# Patient Record
Sex: FEMALE | Race: WHITE | NOT HISPANIC OR LATINO | URBAN - NONMETROPOLITAN AREA
[De-identification: names, ages, dates, MRNs, and addresses within clinical notes are randomized per-mention and may not be internally consistent; named-entity substitution may affect disease eponyms.]

---

## 2020-11-09 ENCOUNTER — TELEPHONE ENCOUNTER (OUTPATIENT)
Dept: URBAN - NONMETROPOLITAN AREA CLINIC 4 | Facility: CLINIC | Age: 45
End: 2020-11-09

## 2020-11-19 ENCOUNTER — OFFICE VISIT (OUTPATIENT)
Dept: URBAN - NONMETROPOLITAN AREA CLINIC 4 | Facility: CLINIC | Age: 45
End: 2020-11-19
Payer: MEDICARE

## 2020-11-19 ENCOUNTER — TELEPHONE ENCOUNTER (OUTPATIENT)
Dept: URBAN - METROPOLITAN AREA CLINIC 92 | Facility: CLINIC | Age: 45
End: 2020-11-19

## 2020-11-19 DIAGNOSIS — K31.84 GASTROPARESIS: ICD-10-CM

## 2020-11-19 DIAGNOSIS — K21.9 GERD (GASTROESOPHAGEAL REFLUX DISEASE): ICD-10-CM

## 2020-11-19 DIAGNOSIS — R11.2 NAUSEA AND VOMITING: ICD-10-CM

## 2020-11-19 DIAGNOSIS — R10.84 ABDOMINAL PAIN, GENERALIZED: ICD-10-CM

## 2020-11-19 PROCEDURE — G9903 PT SCRN TBCO ID AS NON USER: HCPCS | Performed by: REGISTERED NURSE

## 2020-11-19 PROCEDURE — 1036F TOBACCO NON-USER: CPT | Performed by: REGISTERED NURSE

## 2020-11-19 PROCEDURE — G8938 BMI DOC ONL FUP NT DOC: HCPCS | Performed by: REGISTERED NURSE

## 2020-11-19 PROCEDURE — G8482 FLU IMMUNIZE ORDER/ADMIN: HCPCS | Performed by: REGISTERED NURSE

## 2020-11-19 PROCEDURE — G8427 DOCREV CUR MEDS BY ELIG CLIN: HCPCS | Performed by: REGISTERED NURSE

## 2020-11-19 PROCEDURE — 99214 OFFICE O/P EST MOD 30 MIN: CPT | Performed by: REGISTERED NURSE

## 2020-11-19 RX ORDER — CLONAZEPAM 0.5 MG/1
DAILY TABLET ORAL
Qty: 0 | Refills: 0 | Status: ACTIVE | COMMUNITY

## 2020-11-19 RX ORDER — ONDANSETRON HCL 8 MG
TAKE 1 TABLET (8 MG) BY ORAL ROUTE 3 TIMES PER DAY TABLET ORAL
Qty: 0 | Refills: 0 | Status: ACTIVE | COMMUNITY

## 2020-11-19 RX ORDER — ALBUTEROL SULFATE 90 UG/1
INHALE 1 PUFF (90 MCG) BY INHALATION ROUTE EVERY 6 HOURS AS NEEDED AEROSOL, METERED RESPIRATORY (INHALATION)
Qty: 1 | Refills: 0 | Status: ACTIVE | COMMUNITY

## 2020-11-19 RX ORDER — HYDROXYZINE PAMOATE 25 MG/1
TAKE 1 CAPSULE (25 MG) BY ORAL ROUTE 3 TIMES PER DAY CAPSULE ORAL
Qty: 0 | Refills: 0 | Status: ACTIVE | COMMUNITY

## 2020-11-19 RX ORDER — LEVOTHYROXINE SODIUM 0.17 MG/1
TAKE 1 TABLET (175 MCG) BY ORAL ROUTE ONCE DAILY TABLET ORAL 1
Qty: 0 | Refills: 0 | Status: ACTIVE | COMMUNITY

## 2020-11-19 RX ORDER — INSULIN DETEMIR 100 [IU]/ML
INJECT BY SUBCUTANEOUS ROUTE PER PRESCRIBER'S INSTRUCTIONS. INSULIN DOSING REQUIRES INDIVIDUALIZATION INJECTION, SOLUTION SUBCUTANEOUS
Qty: 1 | Refills: 0 | Status: ACTIVE | COMMUNITY

## 2020-11-19 RX ORDER — PANTOPRAZOLE SODIUM 40 MG
TAKE 1 TABLET (40 MG) BY ORAL ROUTE 2 TIMES PER DAY TABLET, DELAYED RELEASE (ENTERIC COATED) ORAL 2
Qty: 0 | Refills: 0 | Status: ACTIVE | COMMUNITY

## 2020-11-19 RX ORDER — ASPIRIN 81 MG/1
TAKE 1 TABLET (81 MG) BY ORAL ROUTE ONCE DAILY TABLET, COATED ORAL 1
Qty: 0 | Refills: 0 | Status: ACTIVE | COMMUNITY

## 2020-11-19 RX ORDER — INSULIN ASPART 100 [IU]/ML
INJECT BY SUBCUTANEOUS ROUTE PER PRESCRIBER'S INSTRUCTIONS. INSULIN DOSING REQUIRES INDIVIDUALIZATION INJECTION, SOLUTION INTRAVENOUS; SUBCUTANEOUS
Qty: 1 | Refills: 0 | Status: ACTIVE | COMMUNITY

## 2020-11-19 RX ORDER — METOCLOPRAMIDE HYDROCHLORIDE 10 MG/1
TAKE 1 TABLET (10 MG) BY ORAL ROUTE 4 TIMES PER DAY 30 MINUTES BEFORE MEALS AND AT BEDTIME TABLET ORAL
Qty: 0 | Refills: 0 | Status: ACTIVE | COMMUNITY

## 2020-11-19 RX ORDER — ARIPIPRAZOLE LAUROXIL 662 MG/2.4ML
INJECT 2.4 MILLILITERS (662 MG) BY INTRAMUSCULAR ROUTE ONCE A MONTH INJECTION, SUSPENSION, EXTENDED RELEASE INTRAMUSCULAR
Qty: 0 | Refills: 0 | Status: ACTIVE | COMMUNITY

## 2020-11-19 RX ORDER — MIRTAZAPINE 30 MG/1
TAKE 1 TABLET (30 MG) BY ORAL ROUTE ONCE DAILY BEFORE BEDTIME TABLET, FILM COATED ORAL 1
Qty: 0 | Refills: 0 | Status: ACTIVE | COMMUNITY

## 2020-11-19 NOTE — PHYSICAL EXAM CONSTITUTIONAL:
obese, well developed, well nourished , in no acute distress , ambulating without difficulty , normal communication ability , ill-appearing

## 2020-11-19 NOTE — PHYSICAL EXAM GASTROINTESTINAL
Abdomen , soft, diffuse TTP, nondistended , multiple well-healed scars, no guarding or rigidity , no masses palpable , normal bowel sounds , Liver and Spleen , no hepatomegaly present , no hepatosplenomegaly , liver nontender , spleen not palpable

## 2020-11-19 NOTE — HPI-TODAY'S VISIT:
The patient is a 43 year old /White female with PMH of GERD, gastroparesis s/p gastric pacemaker implant in 2016, s/p pyloraplasty in 2018 w/ pacemaker removal and J-tube placement on 2/26/19, s/p CCY in 10/2014, anxiety, depression, DM, who presents on self referral, for a gastroenterology evaluation for above issues. She had J-tube placed on 2/26/19 by Dr. Underwood in [place], [place]. She recently moved from [place] to [place] on 2/28/19. She reports pain around the J-tube insertion site. She was having some drainage around incision and went to [facility] last Monday, but was told it was not infected. Denies any further drainage or bleeding. No increased abd pain or N/V with TF. Currently doing gravity TF at home. She is currently taking Reglan 10 mg QID, Zofran 8 mg TID, Phernergan 25 mg BID, Protonix 40 mg BID. She has had several EGD's w/ botox inj in the past. Has been on an Abx for UTI, which is making her nauseated. Has not found a PCP in [place] yet. Denies any other complaints.   4/24/19 j tube replaced better requesting zyprexa courtesy aware of ep s/e reglan  6/19/2019 Patient continues to complain of pain around the J-tube site and leakage.  She has done well with regards to her gastroparesis however since the J-tube is been placed in [place].He has not been evaluated for problems.  9/10/2019 Pt with peg tube that had fallen out. Pt had been seen in wound clinic.  Pt reports that she has had toruble with the J tube. Now with leakage for the tract.   This is a follow-up appointment for this patient, a 44 year old /White female, after a previous visit on 03/11/2019, for an evaluation for gastroparesis. The patient states that they are doing well with no current complaints..   11-6-19 Pt returns for f/u visit. Pt had J tube with Dr Shearer. Hx of pyloroplasty in past. Pt repors hat she is intereseted in [facility] pt depressed has dm neuropathy pt with wound vac for fistula after longstanding J-tube with fistulous tract. Pt is tearful. She has DM stable   2-4-20 Pt returns for f/u visit. Pt reports that she has been doing much better since last visit. had J tube removed complicated by fistula s/p repairment.  Pt now with repair. Pt has decreased opiates and now feeling better. O/w has been in usual state of health.  3/2/20: Caridad returns for follow up. GES on 2/24/20 c/w gastric atony. She has been doing fairly well. Eats pureed diet. Stopped smoking MJ about 6 months ago and has had less episodes of N/V, abd pain. She was diagnosed with thyroid ca in Oct 2019 and has gained approx 50 lbs. Her A1c has jumped up to >10%. She has an appt with Dr. Samson today to discuss bariatric surgery. She never received a call from [place] to set up appt with Dr. Correa.  4-25-20 wife diagnosed with uterine cancer. Pt report that she saw Jessica Alicea and wanted to check on gastric atony.  Pt reports that she has an appt with the bariatric surgeon.  Pt will f/u 6 months  11/19/20: Patient presents for urgent visit. Wife was diagnosed with stage 4 uterine cancer and hospice was called to the home last week. States she has been very stressed and anxious, which has caused GP flare. Vomiting 5-7 x daily. Not able to keep liquids or medications including anti-emetics down. Denies hematemesis. She was seen in ED on 11/17 and found with ketones in urine and prescribed Abx and Zofran. CBC and CMP relatively unremarkable. , Hgb 10.5. Of note, pt never received a call from [place] to schedule appt with Dr. Correa.

## 2020-12-31 ENCOUNTER — OFFICE VISIT (OUTPATIENT)
Dept: URBAN - NONMETROPOLITAN AREA CLINIC 4 | Facility: CLINIC | Age: 45
End: 2020-12-31

## 2021-02-02 ENCOUNTER — OFFICE VISIT (OUTPATIENT)
Dept: URBAN - NONMETROPOLITAN AREA CLINIC 4 | Facility: CLINIC | Age: 46
End: 2021-02-02

## 2021-02-04 ENCOUNTER — OFFICE VISIT (OUTPATIENT)
Dept: URBAN - NONMETROPOLITAN AREA CLINIC 4 | Facility: CLINIC | Age: 46
End: 2021-02-04

## 2022-01-27 ENCOUNTER — TELEPHONE ENCOUNTER (OUTPATIENT)
Dept: URBAN - METROPOLITAN AREA CLINIC 54 | Facility: CLINIC | Age: 47
End: 2022-01-27

## 2022-01-27 RX ORDER — ALBUTEROL SULFATE 90 UG/1
INHALE 1 PUFF (90 MCG) BY INHALATION ROUTE EVERY 6 HOURS AS NEEDED AEROSOL, METERED RESPIRATORY (INHALATION)
Qty: 1 | Refills: 0 | Status: ACTIVE | COMMUNITY

## 2022-01-27 RX ORDER — PANTOPRAZOLE SODIUM 40 MG
TAKE 1 TABLET (40 MG) BY ORAL ROUTE 2 TIMES PER DAY TABLET, DELAYED RELEASE (ENTERIC COATED) ORAL 2
Qty: 0 | Refills: 0 | Status: ACTIVE | COMMUNITY

## 2022-01-27 RX ORDER — CLONAZEPAM 0.5 MG/1
DAILY TABLET ORAL
Qty: 0 | Refills: 0 | Status: ACTIVE | COMMUNITY

## 2022-01-27 RX ORDER — INSULIN DETEMIR 100 [IU]/ML
INJECT BY SUBCUTANEOUS ROUTE PER PRESCRIBER'S INSTRUCTIONS. INSULIN DOSING REQUIRES INDIVIDUALIZATION INJECTION, SOLUTION SUBCUTANEOUS
Qty: 1 | Refills: 0 | Status: ACTIVE | COMMUNITY

## 2022-01-27 RX ORDER — METOCLOPRAMIDE HYDROCHLORIDE 5 MG/5ML
10ML SOLUTION ORAL QID
Qty: 1200 ML | Refills: 3 | OUTPATIENT
Start: 2022-01-27

## 2022-01-27 RX ORDER — METOCLOPRAMIDE HYDROCHLORIDE 10 MG/1
TAKE 1 TABLET (10 MG) BY ORAL ROUTE 4 TIMES PER DAY 30 MINUTES BEFORE MEALS AND AT BEDTIME TABLET ORAL
Qty: 0 | Refills: 0 | Status: ACTIVE | COMMUNITY

## 2022-01-27 RX ORDER — ONDANSETRON HCL 8 MG
TAKE 1 TABLET (8 MG) BY ORAL ROUTE 3 TIMES PER DAY TABLET ORAL
Qty: 0 | Refills: 0 | Status: ACTIVE | COMMUNITY

## 2022-01-27 RX ORDER — MIRTAZAPINE 30 MG/1
TAKE 1 TABLET (30 MG) BY ORAL ROUTE ONCE DAILY BEFORE BEDTIME TABLET, FILM COATED ORAL 1
Qty: 0 | Refills: 0 | Status: ACTIVE | COMMUNITY

## 2022-01-27 RX ORDER — HYDROXYZINE PAMOATE 25 MG/1
TAKE 1 CAPSULE (25 MG) BY ORAL ROUTE 3 TIMES PER DAY CAPSULE ORAL
Qty: 0 | Refills: 0 | Status: ACTIVE | COMMUNITY

## 2022-01-27 RX ORDER — ASPIRIN 81 MG/1
TAKE 1 TABLET (81 MG) BY ORAL ROUTE ONCE DAILY TABLET, COATED ORAL 1
Qty: 0 | Refills: 0 | Status: ACTIVE | COMMUNITY

## 2022-01-27 RX ORDER — LEVOTHYROXINE SODIUM 0.17 MG/1
TAKE 1 TABLET (175 MCG) BY ORAL ROUTE ONCE DAILY TABLET ORAL 1
Qty: 0 | Refills: 0 | Status: ACTIVE | COMMUNITY

## 2022-01-27 RX ORDER — ARIPIPRAZOLE LAUROXIL 662 MG/2.4ML
INJECT 2.4 MILLILITERS (662 MG) BY INTRAMUSCULAR ROUTE ONCE A MONTH INJECTION, SUSPENSION, EXTENDED RELEASE INTRAMUSCULAR
Qty: 0 | Refills: 0 | Status: ACTIVE | COMMUNITY

## 2022-01-27 RX ORDER — INSULIN ASPART 100 [IU]/ML
INJECT BY SUBCUTANEOUS ROUTE PER PRESCRIBER'S INSTRUCTIONS. INSULIN DOSING REQUIRES INDIVIDUALIZATION INJECTION, SOLUTION INTRAVENOUS; SUBCUTANEOUS
Qty: 1 | Refills: 0 | Status: ACTIVE | COMMUNITY

## 2022-03-21 ENCOUNTER — OFFICE VISIT (OUTPATIENT)
Dept: URBAN - METROPOLITAN AREA CLINIC 54 | Facility: CLINIC | Age: 47
End: 2022-03-21

## 2022-03-21 RX ORDER — ARIPIPRAZOLE LAUROXIL 662 MG/2.4ML
INJECT 2.4 MILLILITERS (662 MG) BY INTRAMUSCULAR ROUTE ONCE A MONTH INJECTION, SUSPENSION, EXTENDED RELEASE INTRAMUSCULAR
Qty: 0 | Refills: 0 | Status: ACTIVE | COMMUNITY

## 2022-03-21 RX ORDER — ASPIRIN 81 MG/1
TAKE 1 TABLET (81 MG) BY ORAL ROUTE ONCE DAILY TABLET, COATED ORAL 1
Qty: 0 | Refills: 0 | Status: ACTIVE | COMMUNITY

## 2022-03-21 RX ORDER — INSULIN ASPART 100 [IU]/ML
INJECT BY SUBCUTANEOUS ROUTE PER PRESCRIBER'S INSTRUCTIONS. INSULIN DOSING REQUIRES INDIVIDUALIZATION INJECTION, SOLUTION INTRAVENOUS; SUBCUTANEOUS
Qty: 1 | Refills: 0 | Status: ACTIVE | COMMUNITY

## 2022-03-21 RX ORDER — METOCLOPRAMIDE HYDROCHLORIDE 10 MG/1
TAKE 1 TABLET (10 MG) BY ORAL ROUTE 4 TIMES PER DAY 30 MINUTES BEFORE MEALS AND AT BEDTIME TABLET ORAL
Qty: 0 | Refills: 0 | Status: ACTIVE | COMMUNITY

## 2022-03-21 RX ORDER — CLONAZEPAM 0.5 MG/1
DAILY TABLET ORAL
Qty: 0 | Refills: 0 | Status: ACTIVE | COMMUNITY

## 2022-03-21 RX ORDER — HYDROXYZINE PAMOATE 25 MG/1
TAKE 1 CAPSULE (25 MG) BY ORAL ROUTE 3 TIMES PER DAY CAPSULE ORAL
Qty: 0 | Refills: 0 | Status: ACTIVE | COMMUNITY

## 2022-03-21 RX ORDER — METOCLOPRAMIDE HYDROCHLORIDE 5 MG/5ML
10ML SOLUTION ORAL QID
Qty: 1200 ML | Refills: 3 | Status: ACTIVE | COMMUNITY
Start: 2022-01-27

## 2022-03-21 RX ORDER — PANTOPRAZOLE SODIUM 40 MG
TAKE 1 TABLET (40 MG) BY ORAL ROUTE 2 TIMES PER DAY TABLET, DELAYED RELEASE (ENTERIC COATED) ORAL 2
Qty: 0 | Refills: 0 | Status: ACTIVE | COMMUNITY

## 2022-03-21 RX ORDER — INSULIN DETEMIR 100 [IU]/ML
AS DIRECTED INJECTION, SOLUTION SUBCUTANEOUS
Refills: 0 | Status: ACTIVE | COMMUNITY

## 2022-03-21 RX ORDER — ALBUTEROL SULFATE 90 UG/1
INHALE 1 PUFF (90 MCG) BY INHALATION ROUTE EVERY 6 HOURS AS NEEDED AEROSOL, METERED RESPIRATORY (INHALATION)
Qty: 1 | Refills: 0 | Status: ACTIVE | COMMUNITY

## 2022-03-21 RX ORDER — MIRTAZAPINE 30 MG/1
TAKE 1 TABLET (30 MG) BY ORAL ROUTE ONCE DAILY BEFORE BEDTIME TABLET, FILM COATED ORAL 1
Qty: 0 | Refills: 0 | Status: ACTIVE | COMMUNITY

## 2022-03-21 RX ORDER — ONDANSETRON HCL 8 MG
TAKE 1 TABLET (8 MG) BY ORAL ROUTE 3 TIMES PER DAY TABLET ORAL
Qty: 0 | Refills: 0 | Status: ACTIVE | COMMUNITY

## 2022-03-21 RX ORDER — ROSUVASTATIN CALCIUM 40 MG/1
1 TABLET TABLET, FILM COATED ORAL ONCE A DAY
Status: ACTIVE | COMMUNITY

## 2022-03-21 RX ORDER — LEVOTHYROXINE SODIUM 0.17 MG/1
TAKE 1 TABLET (175 MCG) BY ORAL ROUTE ONCE DAILY TABLET ORAL 1
Qty: 0 | Refills: 0 | Status: ACTIVE | COMMUNITY

## 2022-04-20 ENCOUNTER — OFFICE VISIT (OUTPATIENT)
Dept: URBAN - NONMETROPOLITAN AREA CLINIC 4 | Facility: CLINIC | Age: 47
End: 2022-04-20

## 2022-05-23 ENCOUNTER — WEB ENCOUNTER (OUTPATIENT)
Dept: URBAN - NONMETROPOLITAN AREA CLINIC 4 | Facility: CLINIC | Age: 47
End: 2022-05-23

## 2022-05-23 ENCOUNTER — LAB OUTSIDE AN ENCOUNTER (OUTPATIENT)
Dept: URBAN - NONMETROPOLITAN AREA CLINIC 4 | Facility: CLINIC | Age: 47
End: 2022-05-23

## 2022-05-23 ENCOUNTER — CLAIMS CREATED FROM THE CLAIM WINDOW (OUTPATIENT)
Dept: URBAN - NONMETROPOLITAN AREA CLINIC 4 | Facility: CLINIC | Age: 47
End: 2022-05-23
Payer: MEDICARE

## 2022-05-23 VITALS
DIASTOLIC BLOOD PRESSURE: 77 MMHG | HEIGHT: 69 IN | SYSTOLIC BLOOD PRESSURE: 114 MMHG | WEIGHT: 274.8 LBS | BODY MASS INDEX: 40.7 KG/M2 | TEMPERATURE: 97.9 F | HEART RATE: 84 BPM

## 2022-05-23 DIAGNOSIS — K31.84 GASTROPARESIS: ICD-10-CM

## 2022-05-23 DIAGNOSIS — K46.9 HERNIA: ICD-10-CM

## 2022-05-23 DIAGNOSIS — R10.84 ABDOMINAL CRAMPING, GENERALIZED: ICD-10-CM

## 2022-05-23 DIAGNOSIS — R10.9 ABDOMINAL PAIN: ICD-10-CM

## 2022-05-23 DIAGNOSIS — Z12.11 SCREENING FOR COLON CANCER: ICD-10-CM

## 2022-05-23 DIAGNOSIS — K21.9 GERD (GASTROESOPHAGEAL REFLUX DISEASE): ICD-10-CM

## 2022-05-23 PROCEDURE — 99214 OFFICE O/P EST MOD 30 MIN: CPT | Performed by: REGISTERED NURSE

## 2022-05-23 RX ORDER — LEVOTHYROXINE SODIUM 0.17 MG/1
TAKE 1 TABLET (175 MCG) BY ORAL ROUTE ONCE DAILY TABLET ORAL 1
Qty: 0 | Refills: 0 | Status: DISCONTINUED | COMMUNITY

## 2022-05-23 RX ORDER — CLONAZEPAM 0.5 MG/1
DAILY TABLET ORAL
Qty: 0 | Refills: 0 | Status: DISCONTINUED | COMMUNITY

## 2022-05-23 RX ORDER — METOCLOPRAMIDE HYDROCHLORIDE 10 MG/1
TAKE 1 TABLET (10 MG) BY ORAL ROUTE 4 TIMES PER DAY 30 MINUTES BEFORE MEALS AND AT BEDTIME TABLET ORAL
Qty: 0 | Refills: 0 | Status: ACTIVE | COMMUNITY

## 2022-05-23 RX ORDER — POLYETHYLENE GLYCOL 3350, SODIUM SULFATE, SODIUM CHLORIDE, POTASSIUM CHLORIDE, ASCORBIC ACID, SODIUM ASCORBATE 140-9-5.2G
AS DIRECTED KIT ORAL
Qty: 1 BOTTLE | Refills: 0 | OUTPATIENT
Start: 2022-05-23 | End: 2022-05-24

## 2022-05-23 RX ORDER — METOCLOPRAMIDE HYDROCHLORIDE 5 MG/5ML
10ML SOLUTION ORAL QID
Qty: 1200 ML | Refills: 3 | Status: DISCONTINUED | COMMUNITY
Start: 2022-01-27

## 2022-05-23 RX ORDER — HYDROXYZINE PAMOATE 25 MG/1
TAKE 1 CAPSULE (25 MG) BY ORAL ROUTE 3 TIMES PER DAY CAPSULE ORAL
Qty: 0 | Refills: 0 | Status: DISCONTINUED | COMMUNITY

## 2022-05-23 RX ORDER — PANTOPRAZOLE SODIUM 40 MG
TAKE 1 TABLET (40 MG) BY ORAL ROUTE 2 TIMES PER DAY TABLET, DELAYED RELEASE (ENTERIC COATED) ORAL 2
Qty: 0 | Refills: 0 | Status: ACTIVE | COMMUNITY

## 2022-05-23 RX ORDER — ROSUVASTATIN CALCIUM 40 MG/1
1 TABLET TABLET, FILM COATED ORAL ONCE A DAY
Status: ACTIVE | COMMUNITY

## 2022-05-23 RX ORDER — INSULIN ASPART 100 [IU]/ML
INJECT BY SUBCUTANEOUS ROUTE PER PRESCRIBER'S INSTRUCTIONS. INSULIN DOSING REQUIRES INDIVIDUALIZATION INJECTION, SOLUTION INTRAVENOUS; SUBCUTANEOUS
Qty: 1 | Refills: 0 | Status: DISCONTINUED | COMMUNITY

## 2022-05-23 RX ORDER — MIRTAZAPINE 30 MG/1
TAKE 1 TABLET (30 MG) BY ORAL ROUTE ONCE DAILY BEFORE BEDTIME TABLET, FILM COATED ORAL 1
Qty: 0 | Refills: 0 | Status: DISCONTINUED | COMMUNITY

## 2022-05-23 RX ORDER — INSULIN DETEMIR 100 [IU]/ML
AS DIRECTED INJECTION, SOLUTION SUBCUTANEOUS
Refills: 0 | Status: DISCONTINUED | COMMUNITY

## 2022-05-23 RX ORDER — ALBUTEROL SULFATE 90 UG/1
INHALE 1 PUFF (90 MCG) BY INHALATION ROUTE EVERY 6 HOURS AS NEEDED AEROSOL, METERED RESPIRATORY (INHALATION)
Qty: 1 | Refills: 0 | Status: DISCONTINUED | COMMUNITY

## 2022-05-23 RX ORDER — ARIPIPRAZOLE LAUROXIL 662 MG/2.4ML
INJECT 2.4 MILLILITERS (662 MG) BY INTRAMUSCULAR ROUTE ONCE A MONTH INJECTION, SUSPENSION, EXTENDED RELEASE INTRAMUSCULAR
Qty: 0 | Refills: 0 | Status: DISCONTINUED | COMMUNITY

## 2022-05-23 RX ORDER — ASPIRIN 81 MG/1
TAKE 1 TABLET (81 MG) BY ORAL ROUTE ONCE DAILY TABLET, COATED ORAL 1
Qty: 0 | Refills: 0 | Status: DISCONTINUED | COMMUNITY

## 2022-05-23 RX ORDER — ONDANSETRON HCL 8 MG
TAKE 1 TABLET (8 MG) BY ORAL ROUTE 3 TIMES PER DAY TABLET ORAL
Qty: 0 | Refills: 0 | Status: ACTIVE | COMMUNITY

## 2022-05-23 RX ORDER — EMPAGLIFLOZIN 10 MG/1
1 TABLET TABLET, FILM COATED ORAL ONCE A DAY
Status: ACTIVE | COMMUNITY

## 2022-05-23 NOTE — HPI-TODAY'S VISIT:
The patient is a 43 year old /White female with PMH of GERD, gastroparesis s/p gastric pacemaker implant in 2016, s/p pyloraplasty in 2018 w/ pacemaker removal and J-tube placement on 2/26/19, s/p CCY in 10/2014, anxiety, depression, DM, who presents on self referral, for a gastroenterology evaluation for above issues. She had J-tube placed on 2/26/19 by Dr. Underwood in [place], [place]. She recently moved from [place] to [place] on 2/28/19. She reports pain around the J-tube insertion site. She was having some drainage around incision and went to [facility] last Monday, but was told it was not infected. Denies any further drainage or bleeding. No increased abd pain or N/V with TF. Currently doing gravity TF at home. She is currently taking Reglan 10 mg QID, Zofran 8 mg TID, Phernergan 25 mg BID, Protonix 40 mg BID. She has had several EGD's w/ botox inj in the past. Has been on an Abx for UTI, which is making her nauseated. Has not found a PCP in [place] yet. Denies any other complaints.   4/24/19 j tube replaced better requesting zyprexa courtesy aware of ep s/e reglan  6/19/2019 Patient continues to complain of pain around the J-tube site and leakage.  She has done well with regards to her gastroparesis however since the J-tube is been placed in [place].He has not been evaluated for problems.  9/10/2019 Pt with peg tube that had fallen out. Pt had been seen in wound clinic.  Pt reports that she has had toruble with the J tube. Now with leakage for the tract.   This is a follow-up appointment for this patient, a 44 year old /White female, after a previous visit on 03/11/2019, for an evaluation for gastroparesis. The patient states that they are doing well with no current complaints..   11-6-19 Pt returns for f/u visit. Pt had J tube with Dr Shearer. Hx of pyloroplasty in past. Pt repors hat she is intereseted in [facility] pt depressed has dm neuropathy pt with wound vac for fistula after longstanding J-tube with fistulous tract. Pt is tearful. She has DM stable   2-4-20 Pt returns for f/u visit. Pt reports that she has been doing much better since last visit. had J tube removed complicated by fistula s/p repairment.  Pt now with repair. Pt has decreased opiates and now feeling better. O/w has been in usual state of health.  3/2/20: Caridad returns for follow up. GES on 2/24/20 c/w gastric atony. She has been doing fairly well. Eats pureed diet. Stopped smoking MJ about 6 months ago and has had less episodes of N/V, abd pain. She was diagnosed with thyroid ca in Oct 2019 and has gained approx 50 lbs. Her A1c has jumped up to >10%. She has an appt with Dr. Samson today to discuss bariatric surgery. She never received a call from [place] to set up appt with Dr. Correa.  4-25-20 wife diagnosed with uterine cancer. Pt report that she saw Jessica Alicea and wanted to check on gastric atony.  Pt reports that she has an appt with the bariatric surgeon.  Pt will f/u 6 months  11/19/20: Patient presents for urgent visit. Wife was diagnosed with stage 4 uterine cancer and hospice was called to the home last week. States she has been very stressed and anxious, which has caused GP flare. Vomiting 5-7 x daily. Not able to keep liquids or medications including anti-emetics down. Denies hematemesis. She was seen in ED on 11/17 and found with ketones in urine and prescribed Abx and Zofran. CBC and CMP relatively unremarkable. , Hgb 10.5. Of note, pt never received a call from [place] to schedule appt with Dr. Correa.  5/23/22: Pt RTC for f/u. Needs surgery on hernias, but was told she needs to lose weight. Has lost 15 lbs over past few months. Doing well from GP standpoint. She remains on Reglan QID. Takes Zofran PRN. Spoke to Dr. Correa over phone last year, but no further intervention recommended. She has never had a screening colonoscopy.

## 2022-06-28 ENCOUNTER — OFFICE VISIT (OUTPATIENT)
Dept: URBAN - METROPOLITAN AREA SURGERY CENTER 14 | Facility: SURGERY CENTER | Age: 47
End: 2022-06-28

## 2022-06-28 RX ORDER — ROSUVASTATIN CALCIUM 40 MG/1
1 TABLET TABLET, FILM COATED ORAL ONCE A DAY
Status: ACTIVE | COMMUNITY

## 2022-06-28 RX ORDER — PANTOPRAZOLE SODIUM 40 MG
TAKE 1 TABLET (40 MG) BY ORAL ROUTE 2 TIMES PER DAY TABLET, DELAYED RELEASE (ENTERIC COATED) ORAL 2
Qty: 0 | Refills: 0 | Status: ACTIVE | COMMUNITY

## 2022-06-28 RX ORDER — EMPAGLIFLOZIN 10 MG/1
1 TABLET TABLET, FILM COATED ORAL ONCE A DAY
Status: ACTIVE | COMMUNITY

## 2022-06-28 RX ORDER — ONDANSETRON HCL 8 MG
TAKE 1 TABLET (8 MG) BY ORAL ROUTE 3 TIMES PER DAY TABLET ORAL
Qty: 0 | Refills: 0 | Status: ACTIVE | COMMUNITY

## 2022-06-28 RX ORDER — METOCLOPRAMIDE HYDROCHLORIDE 10 MG/1
TAKE 1 TABLET (10 MG) BY ORAL ROUTE 4 TIMES PER DAY 30 MINUTES BEFORE MEALS AND AT BEDTIME TABLET ORAL
Qty: 0 | Refills: 0 | Status: ACTIVE | COMMUNITY

## 2022-07-11 ENCOUNTER — ERX REFILL RESPONSE (OUTPATIENT)
Dept: URBAN - METROPOLITAN AREA CLINIC 54 | Facility: CLINIC | Age: 47
End: 2022-07-11

## 2022-07-11 RX ORDER — METOCLOPRAMIDE HYDROCHLORIDE 10 MG/1
TAKE 1 TABLET (10 MG) BY ORAL ROUTE 4 TIMES PER DAY 30 MINUTES BEFORE MEALS AND AT BEDTIME TABLET ORAL
Qty: 0 | Refills: 0 | OUTPATIENT

## 2022-07-11 RX ORDER — METOCLOPRAMIDE 5 MG/5ML
TAKE 10ML BY MOUTH 4 TIMES DAILY SOLUTION ORAL
Qty: 1200 MILLILITER | Refills: 3 | OUTPATIENT

## 2022-07-12 ENCOUNTER — OFFICE VISIT (OUTPATIENT)
Dept: URBAN - METROPOLITAN AREA CLINIC 54 | Facility: CLINIC | Age: 47
End: 2022-07-12

## 2022-07-12 RX ORDER — ROSUVASTATIN CALCIUM 40 MG/1
1 TABLET TABLET, FILM COATED ORAL ONCE A DAY
COMMUNITY

## 2022-07-12 RX ORDER — METOCLOPRAMIDE HYDROCHLORIDE 10 MG/1
TAKE 1 TABLET (10 MG) BY ORAL ROUTE 4 TIMES PER DAY 30 MINUTES BEFORE MEALS AND AT BEDTIME TABLET ORAL
Qty: 0 | Refills: 0 | COMMUNITY

## 2022-07-12 RX ORDER — EMPAGLIFLOZIN 10 MG/1
1 TABLET TABLET, FILM COATED ORAL ONCE A DAY
COMMUNITY

## 2022-07-12 RX ORDER — ONDANSETRON HCL 8 MG
TAKE 1 TABLET (8 MG) BY ORAL ROUTE 3 TIMES PER DAY TABLET ORAL
Qty: 0 | Refills: 0 | COMMUNITY

## 2022-07-12 RX ORDER — PANTOPRAZOLE SODIUM 40 MG
TAKE 1 TABLET (40 MG) BY ORAL ROUTE 2 TIMES PER DAY TABLET, DELAYED RELEASE (ENTERIC COATED) ORAL 2
Qty: 0 | Refills: 0 | COMMUNITY

## 2022-10-13 ENCOUNTER — ERX REFILL RESPONSE (OUTPATIENT)
Dept: URBAN - METROPOLITAN AREA CLINIC 54 | Facility: CLINIC | Age: 47
End: 2022-10-13

## 2022-10-13 RX ORDER — METOCLOPRAMIDE 5 MG/5ML
TAKE 10ML BY MOUTH 4 TIMES DAILY SOLUTION ORAL
Qty: 1200 MILLILITER | Refills: 3 | OUTPATIENT

## 2022-10-13 RX ORDER — METOCLOPRAMIDE 5 MG/5ML
TAKE 10ML BY MOUTH 4 TIMES DAILY 30 SOLUTION ORAL
Qty: 1200 MILLILITER | Refills: 3 | OUTPATIENT

## 2022-11-10 ENCOUNTER — OFFICE VISIT (OUTPATIENT)
Dept: URBAN - METROPOLITAN AREA CLINIC 54 | Facility: CLINIC | Age: 47
End: 2022-11-10

## 2022-11-10 RX ORDER — CYCLOBENZAPRINE HYDROCHLORIDE 10 MG/1
1 TABLET AT BEDTIME AS NEEDED TABLET, FILM COATED ORAL ONCE A DAY
COMMUNITY

## 2022-11-10 RX ORDER — PANTOPRAZOLE SODIUM 40 MG/1
1 TABLET TABLET, DELAYED RELEASE ORAL ONCE A DAY
COMMUNITY

## 2022-11-10 RX ORDER — INSULIN DEGLUDEC INJECTION 100 U/ML
AS DIRECTED INJECTION, SOLUTION SUBCUTANEOUS
COMMUNITY

## 2022-11-10 RX ORDER — EMPAGLIFLOZIN 25 MG/1
1 TABLET TABLET, FILM COATED ORAL ONCE A DAY
COMMUNITY

## 2022-11-10 RX ORDER — CLONAZEPAM 0.5 MG/1
1 TABLET ON THE TONGUE AND ALLOW TO DISSOLVE AT BEDTIME TABLET, ORALLY DISINTEGRATING ORAL ONCE A DAY
COMMUNITY

## 2022-11-10 RX ORDER — BUSPIRONE HYDROCHLORIDE 10 MG/1
1 TABLET TABLET ORAL TWICE A DAY
COMMUNITY

## 2022-11-10 RX ORDER — INSULIN ASPART 100 [IU]/ML
AS DIRECTED INJECTION, SOLUTION INTRAVENOUS; SUBCUTANEOUS
COMMUNITY

## 2022-11-10 RX ORDER — VENLAFAXINE HYDROCHLORIDE 100 MG/1
1 TABLET WITH FOOD TABLET ORAL ONCE A DAY
COMMUNITY

## 2022-11-10 RX ORDER — ARIPIPRAZOLE LAUROXIL 662 MG/2.4ML
2.4 ML INJECTION, SUSPENSION, EXTENDED RELEASE INTRAMUSCULAR
COMMUNITY

## 2022-11-10 RX ORDER — MIRTAZAPINE 15 MG/1
1 TABLET AT BEDTIME TABLET, FILM COATED ORAL ONCE A DAY
COMMUNITY

## 2022-11-10 RX ORDER — ONDANSETRON HCL 8 MG
TAKE 1 TABLET (8 MG) BY ORAL ROUTE 3 TIMES PER DAY TABLET ORAL
Qty: 0 | Refills: 0 | COMMUNITY

## 2022-11-10 RX ORDER — LISINOPRIL 5 MG/1
1 TABLET TABLET ORAL ONCE A DAY
COMMUNITY

## 2022-11-10 RX ORDER — OXYCODONE HYDROCHLORIDE 5 MG/1
1 TABLET AS NEEDED TABLET ORAL
COMMUNITY

## 2022-12-20 ENCOUNTER — WEB ENCOUNTER (OUTPATIENT)
Dept: URBAN - METROPOLITAN AREA CLINIC 54 | Facility: CLINIC | Age: 47
End: 2022-12-20

## 2022-12-20 ENCOUNTER — OFFICE VISIT (OUTPATIENT)
Dept: URBAN - METROPOLITAN AREA CLINIC 54 | Facility: CLINIC | Age: 47
End: 2022-12-20
Payer: MEDICARE

## 2022-12-20 ENCOUNTER — DASHBOARD ENCOUNTERS (OUTPATIENT)
Age: 47
End: 2022-12-20

## 2022-12-20 VITALS
WEIGHT: 240.2 LBS | DIASTOLIC BLOOD PRESSURE: 69 MMHG | HEIGHT: 69 IN | SYSTOLIC BLOOD PRESSURE: 94 MMHG | BODY MASS INDEX: 35.58 KG/M2 | HEART RATE: 80 BPM | TEMPERATURE: 97.7 F

## 2022-12-20 DIAGNOSIS — K31.84 GASTROPARESIS: ICD-10-CM

## 2022-12-20 PROCEDURE — 99213 OFFICE O/P EST LOW 20 MIN: CPT | Performed by: STUDENT IN AN ORGANIZED HEALTH CARE EDUCATION/TRAINING PROGRAM

## 2022-12-20 RX ORDER — CLONAZEPAM 0.25 MG/1
1 TABLET ON THE TONGUE AND ALLOW TO DISSOLVE AT BEDTIME TABLET, ORALLY DISINTEGRATING ORAL ONCE A DAY
Status: ACTIVE | COMMUNITY

## 2022-12-20 RX ORDER — BUSPIRONE HYDROCHLORIDE 10 MG/1
1 TABLET TABLET ORAL TWICE A DAY
Status: ACTIVE | COMMUNITY

## 2022-12-20 RX ORDER — METOCLOPRAMIDE 5 MG/5ML
TAKE 10ML BY MOUTH 4 TIMES DAILY 30 SOLUTION ORAL
Qty: 1200 MILLILITER | Refills: 3 | Status: ACTIVE | COMMUNITY

## 2022-12-20 RX ORDER — ROSUVASTATIN CALCIUM 40 MG/1
1 TABLET TABLET, FILM COATED ORAL ONCE A DAY
Status: ACTIVE | COMMUNITY

## 2022-12-20 RX ORDER — LISINOPRIL 5 MG/1
1 TABLET TABLET ORAL ONCE A DAY
Status: ACTIVE | COMMUNITY

## 2022-12-20 RX ORDER — OXYCODONE HYDROCHLORIDE 5 MG/1
1 TABLET AS NEEDED TABLET ORAL
Status: ACTIVE | COMMUNITY

## 2022-12-20 RX ORDER — ARIPIPRAZOLE LAUROXIL 662 MG/2.4ML
2.4 ML INJECTION, SUSPENSION, EXTENDED RELEASE INTRAMUSCULAR
Status: ACTIVE | COMMUNITY

## 2022-12-20 RX ORDER — LEVOTHYROXINE SODIUM 200 UG/1
1 TABLET IN THE MORNING ON AN EMPTY STOMACH TABLET ORAL ONCE A DAY
Status: ACTIVE | COMMUNITY

## 2022-12-20 RX ORDER — OMEPRAZOLE 40 MG/1
1 CAPSULE 30 MINUTES BEFORE MORNING MEAL CAPSULE, DELAYED RELEASE ORAL ONCE A DAY
Status: ACTIVE | COMMUNITY

## 2022-12-20 RX ORDER — CYCLOBENZAPRINE HYDROCHLORIDE 10 MG/1
1 TABLET AT BEDTIME AS NEEDED TABLET, FILM COATED ORAL ONCE A DAY
Status: ACTIVE | COMMUNITY

## 2022-12-20 RX ORDER — METOCLOPRAMIDE 5 MG/5ML
TAKE 10ML BY MOUTH 4 TIMES DAILY 30 SOLUTION ORAL
Qty: 1200 MILLILITER | Refills: 3

## 2022-12-20 RX ORDER — MIRTAZAPINE 15 MG/1
1 TABLET AT BEDTIME TABLET, FILM COATED ORAL ONCE A DAY
Status: ACTIVE | COMMUNITY

## 2022-12-20 RX ORDER — EMPAGLIFLOZIN 25 MG/1
1 TABLET TABLET, FILM COATED ORAL ONCE A DAY
Status: ACTIVE | COMMUNITY

## 2022-12-20 RX ORDER — ONDANSETRON HCL 8 MG
TAKE 1 TABLET (8 MG) BY ORAL ROUTE 3 TIMES PER DAY TABLET ORAL
Qty: 0 | Refills: 0 | Status: ACTIVE | COMMUNITY

## 2022-12-20 RX ORDER — INSULIN ASPART 100 [IU]/ML
AS DIRECTED INJECTION, SOLUTION INTRAVENOUS; SUBCUTANEOUS
Status: ACTIVE | COMMUNITY

## 2022-12-20 RX ORDER — INSULIN DEGLUDEC INJECTION 100 U/ML
AS DIRECTED INJECTION, SOLUTION SUBCUTANEOUS
Status: ACTIVE | COMMUNITY

## 2022-12-20 RX ORDER — ALBUTEROL SULFATE 90 UG/1
1 PUFF AS NEEDED AEROSOL, METERED RESPIRATORY (INHALATION)
Status: ACTIVE | COMMUNITY

## 2022-12-20 RX ORDER — VENLAFAXINE HYDROCHLORIDE 100 MG/1
1 TABLET WITH FOOD TABLET ORAL TWICE A DAY
Status: ACTIVE | COMMUNITY

## 2022-12-20 NOTE — HPI-TODAY'S VISIT:
12/20/2022: Patient states her symptoms of gastroparesis are well controlled.  She denies any nausea, vomiting.  Does not complain of abdominal pain.  She states she had a hernia surgery in October.  She states she is working on losing weight.

## 2022-12-20 NOTE — EXAM-PHYSICAL EXAM
General: Well appearing. No acute distress. HEENT: Anicteric sclerae Cardiovascular: Normal heart rate Respiratory: Breathing comfortably without conversational dyspnea Abdomen: Obese pannus, surgical scars, soft, non-tender Rectal: Deferred Skin: without visible rashes on examined areas. Musculoskeletal: ambulated without difficulty. Can stand from sitting position without assistance. Neuro: No gross focal deficits. Alert and oriented.  Asymmetrical bottom right lip. Psych: Appropriate mood and affect.

## 2022-12-20 NOTE — HPI-OTHER HISTORIES
The patient is a 43 year old /White female with PMH of GERD, gastroparesis s/p gastric pacemaker implant in 2016, s/p pyloraplasty in 2018 w/ pacemaker removal and J-tube placement on 2/26/19, s/p CCY in 10/2014, anxiety, depression, DM, who presents on self referral, for a gastroenterology evaluation for above issues. She had J-tube placed on 2/26/19 by Dr. Underwood in [place], [place]. She recently moved from [place] to [place] on 2/28/19. She reports pain around the J-tube insertion site. She was having some drainage around incision and went to [facility] last Monday, but was told it was not infected. Denies any further drainage or bleeding. No increased abd pain or N/V with TF. Currently doing gravity TF at home. She is currently taking Reglan 10 mg QID, Zofran 8 mg TID, Phernergan 25 mg BID, Protonix 40 mg BID. She has had several EGD's w/ botox inj in the past. Has been on an Abx for UTI, which is making her nauseated. Has not found a PCP in [place] yet. Denies any other complaints.   4/24/19 j tube replaced better requesting zyprexa courtesy aware of ep s/e reglan  6/19/2019 Patient continues to complain of pain around the J-tube site and leakage.  She has done well with regards to her gastroparesis however since the J-tube is been placed in [place].He has not been evaluated for problems.  9/10/2019 Pt with peg tube that had fallen out. Pt had been seen in wound clinic.  Pt reports that she has had toruble with the J tube. Now with leakage for the tract.   This is a follow-up appointment for this patient, a 44 year old /White female, after a previous visit on 03/11/2019, for an evaluation for gastroparesis. The patient states that they are doing well with no current complaints..   11-6-19 Pt returns for f/u visit. Pt had J tube with Dr Shearer. Hx of pyloroplasty in past. Pt repors hat she is intereseted in [facility] pt depressed has dm neuropathy pt with wound vac for fistula after longstanding J-tube with fistulous tract. Pt is tearful. She has DM stable   2-4-20 Pt returns for f/u visit. Pt reports that she has been doing much better since last visit. had J tube removed complicated by fistula s/p repairment.  Pt now with repair. Pt has decreased opiates and now feeling better. O/w has been in usual state of health.  3/2/20: Caridad returns for follow up. GES on 2/24/20 c/w gastric atony. She has been doing fairly well. Eats pureed diet. Stopped smoking MJ about 6 months ago and has had less episodes of N/V, abd pain. She was diagnosed with thyroid ca in Oct 2019 and has gained approx 50 lbs. Her A1c has jumped up to >10%. She has an appt with Dr. Samson today to discuss bariatric surgery. She never received a call from [place] to set up appt with Dr. Correa.  4-25-20 wife diagnosed with uterine cancer. Pt report that she saw Jessica Alicea and wanted to check on gastric atony.  Pt reports that she has an appt with the bariatric surgeon.  Pt will f/u 6 months  11/19/20: Patient presents for urgent visit. Wife was diagnosed with stage 4 uterine cancer and hospice was called to the home last week. States she has been very stressed and anxious, which has caused GP flare. Vomiting 5-7 x daily. Not able to keep liquids or medications including anti-emetics down. Denies hematemesis. She was seen in ED on 11/17 and found with ketones in urine and prescribed Abx and Zofran. CBC and CMP relatively unremarkable. , Hgb 10.5. Of note, pt never received a call from [place] to schedule appt with Dr. Correa.  5/23/22: Pt RTC for f/u. Needs surgery on hernias, but was told she needs to lose weight. Has lost 15 lbs over past few months. Doing well from GP standpoint. She remains on Reglan QID. Takes Zofran PRN. Spoke to Dr. Correa over phone last year, but no further intervention recommended. She has never had a screening colonoscopy.

## 2023-01-03 ENCOUNTER — ERX REFILL RESPONSE (OUTPATIENT)
Dept: URBAN - METROPOLITAN AREA CLINIC 54 | Facility: CLINIC | Age: 48
End: 2023-01-03

## 2023-01-03 RX ORDER — METOCLOPRAMIDE 5 MG/5ML
TAKE 10ML BY MOUTH 4 TIMES DAILY 30 SOLUTION ORAL
Qty: 1200 MILLILITER | Refills: 3 | OUTPATIENT

## 2023-05-23 ENCOUNTER — OFFICE VISIT (OUTPATIENT)
Dept: URBAN - NONMETROPOLITAN AREA CLINIC 4 | Facility: CLINIC | Age: 48
End: 2023-05-23

## 2023-06-28 ENCOUNTER — OFFICE VISIT (OUTPATIENT)
Dept: URBAN - NONMETROPOLITAN AREA CLINIC 4 | Facility: CLINIC | Age: 48
End: 2023-06-28